# Patient Record
Sex: MALE | Race: BLACK OR AFRICAN AMERICAN
[De-identification: names, ages, dates, MRNs, and addresses within clinical notes are randomized per-mention and may not be internally consistent; named-entity substitution may affect disease eponyms.]

---

## 2018-03-10 ENCOUNTER — HOSPITAL ENCOUNTER (EMERGENCY)
Dept: HOSPITAL 17 - NEPC | Age: 33
LOS: 1 days | Discharge: HOME | End: 2018-03-11
Payer: COMMERCIAL

## 2018-03-10 VITALS — HEART RATE: 81 BPM | OXYGEN SATURATION: 97 % | RESPIRATION RATE: 16 BRPM

## 2018-03-10 VITALS
OXYGEN SATURATION: 96 % | DIASTOLIC BLOOD PRESSURE: 82 MMHG | HEART RATE: 88 BPM | SYSTOLIC BLOOD PRESSURE: 157 MMHG | TEMPERATURE: 98.6 F | RESPIRATION RATE: 22 BRPM

## 2018-03-10 VITALS — BODY MASS INDEX: 34.6 KG/M2 | WEIGHT: 220.46 LBS | HEIGHT: 67 IN

## 2018-03-10 DIAGNOSIS — I49.9: ICD-10-CM

## 2018-03-10 DIAGNOSIS — J45.901: Primary | ICD-10-CM

## 2018-03-10 DIAGNOSIS — R73.9: ICD-10-CM

## 2018-03-10 LAB
BASOPHILS # BLD AUTO: 0.1 TH/MM3 (ref 0–0.2)
BASOPHILS NFR BLD: 0.7 % (ref 0–2)
BUN SERPL-MCNC: 7 MG/DL (ref 7–18)
CALCIUM SERPL-MCNC: 8.5 MG/DL (ref 8.5–10.1)
CHLORIDE SERPL-SCNC: 102 MEQ/L (ref 98–107)
CREAT SERPL-MCNC: 1.24 MG/DL (ref 0.6–1.3)
EOSINOPHIL # BLD: 0.7 TH/MM3 (ref 0–0.4)
EOSINOPHIL NFR BLD: 8.4 % (ref 0–4)
ERYTHROCYTE [DISTWIDTH] IN BLOOD BY AUTOMATED COUNT: 12.5 % (ref 11.6–17.2)
GFR SERPLBLD BASED ON 1.73 SQ M-ARVRAT: 82 ML/MIN (ref 89–?)
GLUCOSE SERPL-MCNC: 252 MG/DL (ref 74–106)
HCO3 BLD-SCNC: 23.9 MEQ/L (ref 21–32)
HCT VFR BLD CALC: 40.7 % (ref 39–51)
HGB BLD-MCNC: 14.2 GM/DL (ref 13–17)
LYMPHOCYTES # BLD AUTO: 3.8 TH/MM3 (ref 1–4.8)
LYMPHOCYTES NFR BLD AUTO: 43.1 % (ref 9–44)
MAGNESIUM SERPL-MCNC: 1.7 MG/DL (ref 1.5–2.5)
MCH RBC QN AUTO: 27.9 PG (ref 27–34)
MCHC RBC AUTO-ENTMCNC: 34.8 % (ref 32–36)
MCV RBC AUTO: 80.1 FL (ref 80–100)
MONOCYTE #: 0.6 TH/MM3 (ref 0–0.9)
MONOCYTES NFR BLD: 7.3 % (ref 0–8)
NEUTROPHILS # BLD AUTO: 3.6 TH/MM3 (ref 1.8–7.7)
NEUTROPHILS NFR BLD AUTO: 40.5 % (ref 16–70)
PLATELET # BLD: 361 TH/MM3 (ref 150–450)
PMV BLD AUTO: 9.3 FL (ref 7–11)
RBC # BLD AUTO: 5.08 MIL/MM3 (ref 4.5–5.9)
SODIUM SERPL-SCNC: 137 MEQ/L (ref 136–145)
TROPONIN I SERPL-MCNC: 0.02 NG/ML (ref 0.02–0.05)
WBC # BLD AUTO: 8.8 TH/MM3 (ref 4–11)

## 2018-03-10 PROCEDURE — 82550 ASSAY OF CK (CPK): CPT

## 2018-03-10 PROCEDURE — 85025 COMPLETE CBC W/AUTO DIFF WBC: CPT

## 2018-03-10 PROCEDURE — 94640 AIRWAY INHALATION TREATMENT: CPT

## 2018-03-10 PROCEDURE — 84484 ASSAY OF TROPONIN QUANT: CPT

## 2018-03-10 PROCEDURE — 71045 X-RAY EXAM CHEST 1 VIEW: CPT

## 2018-03-10 PROCEDURE — 82552 ASSAY OF CPK IN BLOOD: CPT

## 2018-03-10 PROCEDURE — 96374 THER/PROPH/DIAG INJ IV PUSH: CPT

## 2018-03-10 PROCEDURE — 80048 BASIC METABOLIC PNL TOTAL CA: CPT

## 2018-03-10 PROCEDURE — 94664 DEMO&/EVAL PT USE INHALER: CPT

## 2018-03-10 PROCEDURE — 93005 ELECTROCARDIOGRAM TRACING: CPT

## 2018-03-10 PROCEDURE — 83735 ASSAY OF MAGNESIUM: CPT

## 2018-03-10 PROCEDURE — 99285 EMERGENCY DEPT VISIT HI MDM: CPT

## 2018-03-10 RX ADMIN — IPRATROPIUM BROMIDE AND ALBUTEROL SULFATE SCH AMPULE: .5; 3 SOLUTION RESPIRATORY (INHALATION) at 21:56

## 2018-03-10 NOTE — RADRPT
EXAM DATE/TIME:  03/10/2018 21:28 

 

[facility] COMPARISON:     

No previous studies available for comparison.

 

                     

INDICATIONS :     

Chest tightness and shortness of breath.

                     

 

MEDICAL HISTORY :            

Asthma.   

 

SURGICAL HISTORY :     

None.   

 

ENCOUNTER:     

Initial                                        

 

ACUITY:     

1 day      

 

PAIN SCORE:     

0/10

 

LOCATION:     

Bilateral chest 

 

FINDINGS:     

A single view of the chest demonstrates the lungs to be symmetrically aerated without evidence of mas
s, infiltrate or effusion.  The cardiomediastinal contours are unremarkable.  Osseous structures are 
intact.

 

CONCLUSION:     No acute disease.  

 

 

 

 Tre Ashley MD on March 10, 2018 at 22:10           

Board Certified Radiologist.

 This report was verified electronically.

## 2018-03-10 NOTE — PD
HPI


Chief Complaint:  Respiratory Symptoms


Time Seen by Provider:  21:09


Travel History


International Travel<30 days:  No


Contact w/Intl Traveler<30days:  No


Traveled to known affect area:  No





History of Present Illness


HPI


33 year old male presents to the emergency department for evaluation of chest 

tightness, SOB that started a couple of hours ago.  Patient states he has 

history of asthma, but states his asthma is "atypical and occurs once every 7 

years".  Patient states he has not had these symptoms in approximately 7 years.

  Patient denies any other medical problems and takes no other medications.  He 

states he does not currently have an albuterol inhaler.  Patient denies any 

fevers, chills.  No abdominal pain, nausea, vomiting, diarrhea.  He denies any 

recent surgery or travel.  No hemoptysis.  No leg edema.  No history of DVT or 

PE.  No exacerbating or alleviating factors.  Moderate severity.





PFSH


Past Medical History


Asthma:  Yes


Tetanus Vaccination:  < 5 Years


Influenza Vaccination:  No





Past Surgical History


Surgical History:  No Previous Surgery





Social History


Alcohol Use:  No


Tobacco Use:  No


Substance Use:  No





Allergies-Medications


(Allergen,Severity, Reaction):  


Coded Allergies:  


     No Known Allergies (Unverified , 3/10/18)


Reported Meds & Prescriptions





Reported Meds & Active Scripts


Active


No Active Prescriptions or Reported Medications    








Review of Systems


Except as stated in HPI:  all other systems reviewed are Neg





Physical Exam


Narrative


GENERAL: Well developed, well nourished male patient, ambulatory and in no 

acute distress.  Afebrile.


SKIN: Warm and dry.


HEAD: Normocephalic.  Atraumatic.


EYES: No scleral icterus. No injection or drainage. 


NECK: Supple, trachea midline. No JVD or lymphadenopathy.


CARDIOVASCULAR: Regular rate and rhythm without murmurs, gallops, or rubs. 


RESPIRATORY: Breath sounds equal bilaterally. No accessory muscle use.  Lung 

sounds diminished throughout.


GASTROINTESTINAL: Abdomen soft, non-tender, nondistended. 


MUSCULOSKELETAL: No cyanosis, or edema. 


BACK: Nontender without obvious deformity. No CVA tenderness.








Data


Data


Last Documented VS





Vital Signs








  Date Time  Temp Pulse Resp B/P (MAP) Pulse Ox O2 Delivery O2 Flow Rate FiO2


 


3/10/18 21:45     99 Nasal Cannula 2.00 


 


3/10/18 21:44  81 16     


 


3/10/18 20:52 98.6       








Orders





 Orders


Complete Blood Count With Diff (3/10/18 21:17)


Basic Metabolic Panel (Bmp) (3/10/18 21:17)


Magnesium (Mg) (3/10/18 21:17)


Ckmb (Isoenzyme) Profile (3/10/18 21:17)


Troponin I (3/10/18 21:17)


Iv Access Insert/Monitor (3/10/18 21:17)


Electrocardiogram (3/10/18 21:17)


Ecg Monitoring (3/10/18 21:17)


Oximetry (3/10/18 21:17)


Oxygen Administration (3/10/18 21:17)


Chest, Single Ap (3/10/18 21:17)


Sodium Chloride 0.9% Flush (Ns Flush) (3/10/18 21:30)


Methylprednisolone So Succ Inj (Solumedr (3/10/18 21:30)


Albuterol-Ipratropium Neb (Duoneb Neb) (3/10/18 21:30)


CKMB (3/10/18 21:40)


CKMB% (3/10/18 21:40)





Labs





Laboratory Tests








Test


  3/10/18


21:40


 


White Blood Count 8.8 TH/MM3 


 


Red Blood Count 5.08 MIL/MM3 


 


Hemoglobin 14.2 GM/DL 


 


Hematocrit 40.7 % 


 


Mean Corpuscular Volume 80.1 FL 


 


Mean Corpuscular Hemoglobin 27.9 PG 


 


Mean Corpuscular Hemoglobin


Concent 34.8 % 


 


 


Red Cell Distribution Width 12.5 % 


 


Platelet Count 361 TH/MM3 


 


Mean Platelet Volume 9.3 FL 


 


Neutrophils (%) (Auto) 40.5 % 


 


Lymphocytes (%) (Auto) 43.1 % 


 


Monocytes (%) (Auto) 7.3 % 


 


Eosinophils (%) (Auto) 8.4 % 


 


Basophils (%) (Auto) 0.7 % 


 


Neutrophils # (Auto) 3.6 TH/MM3 


 


Lymphocytes # (Auto) 3.8 TH/MM3 


 


Monocytes # (Auto) 0.6 TH/MM3 


 


Eosinophils # (Auto) 0.7 TH/MM3 


 


Basophils # (Auto) 0.1 TH/MM3 


 


CBC Comment DIFF FINAL 


 


Differential Comment  


 


Blood Urea Nitrogen 7 MG/DL 


 


Creatinine 1.24 MG/DL 


 


Random Glucose 252 MG/DL 


 


Calcium Level 8.5 MG/DL 


 


Magnesium Level 1.7 MG/DL 


 


Sodium Level 137 MEQ/L 


 


Potassium Level 3.8 MEQ/L 


 


Chloride Level 102 MEQ/L 


 


Carbon Dioxide Level 23.9 MEQ/L 


 


Anion Gap 11 MEQ/L 


 


Estimat Glomerular Filtration


Rate 82 ML/MIN 


 


 


Total Creatine Kinase 152 U/L 


 


Creatine Kinase MB 0.9 NG/ML 


 


Troponin I 0.02 NG/ML 











MDM


Medical Decision Making


Medical Screen Exam Complete:  Yes


Emergency Medical Condition:  Yes


Medical Record Reviewed:  Yes


Interpretation(s)


chest x-ray - CONCLUSION:     No acute disease.


Differential Diagnosis


asthma exacerbation vs. pneumonia vs. ACS


Narrative Course


33 year old male presents to the emergency department for evaluation of 

shortness of breath, chest tightness for a few hours.  EKG, CBC, BMP, Magnesium

, CK, Troponin, chest x-ray are ordered and pending.  Patient is given duoneb x3

, solumedrol 125 mg IV.





EKG shows SR, HR 62, no acute ST changes.  CBC shows no acute abnormalities.  

BMP shows hyperglycemia of 252, no acute abnormalities.  CK is 152.  Troponin 

is 0.02.  Magnesium is 1.7.  Chest x-ray shows no acute disease.





Upon re-evaluation, patient states he is feeling much better and chest 

tightness is gone.





Patient will be discharged with a RX for albuterol inhaler and prednisone.  He 

verbalizes agreement and understanding.





Diagnosis





 Primary Impression:  


 Asthma exacerbation


 Qualified Codes:  J45.21 - Mild intermittent asthma with (acute) exacerbation


Referrals:  


Primary Care Physician


call for appointment


Patient Instructions:  Asthma (ED), General Instructions





***Additional Instructions:  


Use albuterol inhaler as directed as needed for SOB/wheezing.


Take prednisone as directed.  Start this tomorrow.


Follow up with your primary care physician.


Return to the emergency department for any acute, worsening of symptoms.


***Med/Other Pt SpecificInfo:  Prescription(s) given


Scripts


Prednisone (Prednisone) 20 Mg Tab


40 MG PO DAILY for 4 Days, #8 TAB 0 Refills


   Prov: Wendi Easton         3/10/18 


Albuterol 18 GM Inh (Ventolin Hfa 18 GM Inh) 90 Mcg/Act Aer


1 PUFF INH Q4H Y for SHORTNESS OF BREATH, #1 INHALER 0 Refills


   Prov: Wendi Easton         3/10/18


Disposition:  01 DISCHARGE HOME


Condition:  Stable











Wendi Easton Mar 10, 2018 21:30

## 2018-03-10 NOTE — PD
Physical Exam


Narrative


I, Dr. Shelley, have reviewed the advance practice practitioner's documentation and 

am in agreement, met with the patient face to face, made the diagnosis, and the 

medical decision making was done by me.  





*My assessment and Findings: Asthma exacerbation vs. pneumonia





34yo M with PMH of asthma here with sob for a few hours.  Feels like his 

asthma.  Labs reviewed, no leukocytosis.  H/H normal.  Glucose mildly elevated 

at 252.  Troponin negative.  CXR: Negative.  Pt given methylprednisolone and 

duonebs x3.  Pt reevaluated at bedside and is feeling much better.  Pt 

ambulating in the ED without any sob and wants to go home.  Lungs are clear.  

Return precautions given.





Data


Data


Last Documented VS





Vital Signs








  Date Time  Temp Pulse Resp B/P (MAP) Pulse Ox O2 Delivery O2 Flow Rate FiO2


 


3/10/18 21:45     99 Nasal Cannula 2.00 


 


3/10/18 21:44  81 16     


 


3/10/18 20:52 98.6       








Orders





 Orders


Complete Blood Count With Diff (3/10/18 21:17)


Basic Metabolic Panel (Bmp) (3/10/18 21:17)


Magnesium (Mg) (3/10/18 21:17)


Ckmb (Isoenzyme) Profile (3/10/18 21:17)


Troponin I (3/10/18 21:17)


Iv Access Insert/Monitor (3/10/18 21:17)


Electrocardiogram (3/10/18 21:17)


Ecg Monitoring (3/10/18 21:17)


Oximetry (3/10/18 21:17)


Oxygen Administration (3/10/18 21:17)


Chest, Single Ap (3/10/18 21:17)


Sodium Chloride 0.9% Flush (Ns Flush) (3/10/18 21:30)


Methylprednisolone So Succ Inj (Solumedr (3/10/18 21:30)


Albuterol-Ipratropium Neb (Duoneb Neb) (3/10/18 21:30)


CKMB (3/10/18 21:40)


CKMB% (3/10/18 21:40)





Labs





Laboratory Tests








Test


  3/10/18


21:40


 


White Blood Count 8.8 TH/MM3 


 


Red Blood Count 5.08 MIL/MM3 


 


Hemoglobin 14.2 GM/DL 


 


Hematocrit 40.7 % 


 


Mean Corpuscular Volume 80.1 FL 


 


Mean Corpuscular Hemoglobin 27.9 PG 


 


Mean Corpuscular Hemoglobin


Concent 34.8 % 


 


 


Red Cell Distribution Width 12.5 % 


 


Platelet Count 361 TH/MM3 


 


Mean Platelet Volume 9.3 FL 


 


Neutrophils (%) (Auto) 40.5 % 


 


Lymphocytes (%) (Auto) 43.1 % 


 


Monocytes (%) (Auto) 7.3 % 


 


Eosinophils (%) (Auto) 8.4 % 


 


Basophils (%) (Auto) 0.7 % 


 


Neutrophils # (Auto) 3.6 TH/MM3 


 


Lymphocytes # (Auto) 3.8 TH/MM3 


 


Monocytes # (Auto) 0.6 TH/MM3 


 


Eosinophils # (Auto) 0.7 TH/MM3 


 


Basophils # (Auto) 0.1 TH/MM3 


 


CBC Comment DIFF FINAL 


 


Differential Comment  


 


Blood Urea Nitrogen 7 MG/DL 


 


Creatinine 1.24 MG/DL 


 


Random Glucose 252 MG/DL 


 


Calcium Level 8.5 MG/DL 


 


Magnesium Level 1.7 MG/DL 


 


Sodium Level 137 MEQ/L 


 


Potassium Level 3.8 MEQ/L 


 


Chloride Level 102 MEQ/L 


 


Carbon Dioxide Level 23.9 MEQ/L 


 


Anion Gap 11 MEQ/L 


 


Estimat Glomerular Filtration


Rate 82 ML/MIN 


 


 


Total Creatine Kinase 152 U/L 


 


Creatine Kinase MB 0.9 NG/ML 


 


Troponin I 0.02 NG/ML 











[facility]


Supervised Visit with YAIMA:  Yes


Interpretation(s)


EKG: NSR 67bpm.  Normal axis.  No ST segment elevation or depression.  TWI III.


Diagnosis





 Primary Impression:  


 Asthma exacerbation


 Qualified Codes:  J45.21 - Mild intermittent asthma with (acute) exacerbation


Scripts


Albuterol 18 GM Inh (Ventolin Hfa 18 GM Inh) 90 Mcg/Act Aer


2 PUFF INH Q4H Y for SHORTNESS OF BREATH, #1 INHALER 0 Refills


   Prov: Rosanna Shelley DO         3/11/18 


Prednisone (Prednisone) 20 Mg Tab


40 MG PO DAILY for 4 Days, #8 TAB 0 Refills


   Prov: Wendi Easton         3/10/18











Rosanna Shelley DO Mar 10, 2018 22:35

## 2018-03-11 NOTE — EKG
Date Performed: 03/10/2018       Time Performed: 21:52:36

 

PTAGE:      33 years

 

EKG:      Sinus rhythm 

 

 WITH SINUS ARRHYTHMIA Probable early repolarization NORMAL ECG 

 

NO PREVIOUS TRACING            

 

DOCTOR:   Pastor Bonner  Interpretating Date/Time  03/11/2018 10:10:57